# Patient Record
Sex: FEMALE
[De-identification: names, ages, dates, MRNs, and addresses within clinical notes are randomized per-mention and may not be internally consistent; named-entity substitution may affect disease eponyms.]

---

## 2019-11-20 ENCOUNTER — HOSPITAL ENCOUNTER (INPATIENT)
Dept: HOSPITAL 56 - MW.OBCHECK | Age: 44
LOS: 2 days | Discharge: HOME | DRG: 560 | End: 2019-11-22
Attending: OBSTETRICS & GYNECOLOGY | Admitting: OBSTETRICS & GYNECOLOGY
Payer: SELF-PAY

## 2019-11-20 DIAGNOSIS — Z3A.36: ICD-10-CM

## 2019-11-20 DIAGNOSIS — Z88.0: ICD-10-CM

## 2019-11-20 PROCEDURE — 0KQM0ZZ REPAIR PERINEUM MUSCLE, OPEN APPROACH: ICD-10-PCS | Performed by: OBSTETRICS & GYNECOLOGY

## 2019-11-20 NOTE — PCM.PREANE
Preanesthetic Assessment





- Procedure


Proposed Procedure: 





Labor epidural








- Anesthesia/Transfusion/Family Hx


Anesthesia History: Prior Anesthesia Without Reaction


Family History of Anesthesia Reaction: No





- Review of Systems


General: No Symptoms


Pulmonary: No Symptoms


Cardiovascular: No Symptoms


Gastrointestinal: No Symptoms


Neurological: No Symptoms


Other: Reports: None





- Physical Assessment


NPO Status Date: 19


NPO Status Time: 03:24


Height: 5 ft 5 in


Weight: 77.111 kg


ASA Class: 2


Mental Status: Alert & Oriented x3


Airway Class: Mallampati = 2


Dentition: Reports: Normal Dentition


Thyro-Mental Finger Breadths: 3


ROM/Head Extension: Full


Lungs: Clear to Auscultation, Normal Respiratory Effort


Cardiovascular: Regular Rate, Regular Rhythm


Other:  - 7 cm dilated after SROM





- Lab


Values: 





 Laboratory Last Values











WBC  10.11 K/uL (4.0-11.0)   19  02:58    


 


RBC  4.49 M/uL (4.30-5.90)   19  02:58    


 


Hgb  14.5 g/dL (12.0-16.0)   19  02:58    


 


Hct  41.0 % (36.0-46.0)   19  02:58    


 


MCV  91.3 fL (80.0-98.0)   19  02:58    


 


MCH  32.3 pg (27.0-32.0)  H  19  02:58    


 


MCHC  35.4 g/dL (31.0-37.0)   19  02:58    


 


RDW Std Deviation  45.8 fl (28.0-62.0)   19  02:58    


 


RDW Coeff of Nael  14 % (11.0-15.0)   19  02:58    


 


Plt Count  179 K/uL (150-400)   19  02:58    


 


MPV  12.40 fL (7.40-12.00)  H  19  02:58    


 


Nucleated RBC %  0.0 /100WBC  19  02:58    


 


Nucleated RBCs #  0 K/uL  19  02:58    














- Allergies


Allergies/Adverse Reactions: 


 Allergies











Allergy/AdvReac Type Severity Reaction Status Date / Time


 


Penicillins Allergy  unknown Verified 10/08/15 04:52














- Acknowledgements


Anesthesia Type Planned: Epidural


Pt an Appropriate Candidate for the Planned Anesthesia: Yes


Alternatives and Risks of Anesthesia Discussed w Pt/Guardian: Yes


Pt/Guardian Understands and Agrees with Anesthesia Plan: Yes





PreAnesthesia Questionnaire





- SUBSTANCE USE


Smoking Status *Q: Never Smoker


Second Hand Smoke Exposure: No


Recreational Drug Use History: No





- HOME MEDS


Home Medications: 


 Home Meds





Acetaminophen/HYDROcodone [Norco 325-5 MG] 1 tab PO Q4H PRN #40 tab 10/09/15 [Rx

]


Docusate Sodium [Colace] 100 mg PO BID #30 cap 10/09/15 [Rx]











- CURRENT (IN HOUSE) MEDS


Current Meds: 





 Current Medications





Butorphanol Tartrate (Stadol)  1 mg IVPUSH Q1H PRN


   PRN Reason: Pain


Carboprost Tromethamine (Hemabate Ds)  250 mcg IM ASDIRECTED PRN


   PRN Reason: Post Partum Hemorrhage


Clindamycin Phosphate 900 mg/ (Premix)  50 mls @ 100 mls/hr IV Q8H [facility]


   Last Admin: 19 03:06 Dose:  100 mls/hr


Lactated Ringer's (Ringers, Lactated)  1,000 mls @ 150 mls/hr IV ASDIRECTED [facility]


   Last Admin: 19 03:06 Dose:  150 mls/hr


Oxytocin/Sodium Chloride (Oxytocin 30 Unit/500 Ml-Ns)  30 unit in 500 mls @ 500 

mls/hr IV TITRATE JW


Tranexamic Acid 1,000 mg/ (Sodium Chloride)  110 mls @ 660 mls/hr IV ONETIME PRN


   PRN Reason: Bleeding


Lidocaine HCl (Xylocaine 1%)  50 ml INJECT ONETIME PRN


   PRN Reason: Laceration repair


Methylergonovine Maleate (Methergine)  0.2 mg IM ASDIRECTED PRN


   PRN Reason: Post Partum Hemorrhage


Misoprostol (Cytotec)  200 mcg PO ONETIME PRN


   PRN Reason: Post Partum Hemorrhage


Nalbuphine HCl (Nubain)  10 mg IVPUSH Q1H PRN


   PRN Reason: Pain (severe 7-10)


Ondansetron HCl (Zofran)  4 mg IVPUSH Q4H PRN


   PRN Reason: Nausea/Vomiting


Sodium Chloride (Saline Flush)  10 ml FLUSH ASDIRECTED PRN


   PRN Reason: Keep Vein Open


Sodium Chloride (Saline Flush)  2.5 ml FLUSH ASDIRECTED PRN


   PRN Reason: Keep Vein Open


Sodium Chloride (Normal Saline)  10 ml IV ASDIRECTED PRN


   PRN Reason: IV Use


Sterile Water (Sterile Water For Irrigation)  1,000 ml IRR ASDIRECTED PRN


   PRN Reason: delivery





Discontinued Medications





Fentanyl/Bupivacaine HCl (Fentanyl-Bupiv-Ns 2 Mcg/Ml-0.125%) Confirm 

Administered Dose 100 mls @ as directed .ROUTE .[facility] ONE


   Stop: 19 03:19

## 2019-11-20 NOTE — [id number]
Post Anesthesia Note





- EVALUATION WITHIN 48HRS OF ANESTHETIC


Vital Signs in Normal Range: Yes


Patient Participated in Evaluation: Yes


Respiratory Function Stable: Yes


Airway Patent: Yes


Cardiovascular Function Stable: Yes


Hydration Status Stable: Yes


Pain Control Satisfactory: Yes


Nausea and Vomiting Control Satisfactory: Yes


Mental Status Recovered: Yes





- COMMENTS/OBSERVATIONS


Free Text/Narrative:: 


Function fully returned. Has been up to the bathroom. No concerns.

## 2019-11-20 NOTE — PCM.DEL
L & D Note





- General Info


Date of Service: 19


Mother's Due Date: 19





- Delivery Note


Labor: Spontaneous


Delivery Outcome: Livebirth


Infant Delivery Mode: Spontaneous


Birth Presentation: Left Occiput Anterior (ARACELI)


Nuchal Cord: None


Prep: Other


Anesthesia Type: None


Amniotic Fluid Description: Clear


Episiotomy Type: None


Laceration: None


Placenta: Intact, Spontaneous


Cord: 3 Vessels


Estimated Blood Loss: 200


Resuscitation Needed: No


[place]: Suctioned


APGAR Score 1 min: 8


APGAR Score 5 min: 9





- General Info


Date of Service: 19





- Patient Data


Weight - Most Recent: 77.111 kg


Lab Results Last 24 Hours: 


 Laboratory Results - last 24 hr











  19 Range/Units





  02:58 02:58 


 


WBC   10.11  (4.0-11.0)  K/uL


 


RBC   4.49  (4.30-5.90)  M/uL


 


Hgb   14.5  (12.0-16.0)  g/dL


 


Hct   41.0  (36.0-46.0)  %


 


MCV   91.3  (80.0-98.0)  fL


 


MCH   32.3 H  (27.0-32.0)  pg


 


MCHC   35.4  (31.0-37.0)  g/dL


 


RDW Std Deviation   45.8  (28.0-62.0)  fl


 


RDW Coeff of Nael   14  (11.0-15.0)  %


 


Plt Count   179  (150-400)  K/uL


 


MPV   12.40 H  (7.40-12.00)  fL


 


Nucleated RBC %   0.0  /100WBC


 


Nucleated RBCs #   0  K/uL


 


Blood Type  B POSITIVE   


 


Antibody Screen  NEGATIVE   











Med Orders - Current: 


 Current Medications





Butorphanol Tartrate (Stadol)  1 mg IVPUSH Q1H PRN


   PRN Reason: Pain


Carboprost Tromethamine (Hemabate Ds)  250 mcg IM ASDIRECTED PRN


   PRN Reason: Post Partum Hemorrhage


Clindamycin Phosphate 900 mg/ (Premix)  50 mls @ 100 mls/hr IV Q8H [facility]


   Last Admin: 19 03:06 Dose:  100 mls/hr


Lactated Ringer's (Ringers, Lactated)  1,000 mls @ 150 mls/hr IV ASDIRECTED [facility]


   Last Admin: 19 03:52 Dose:  150 mls/hr


Oxytocin/Sodium Chloride (Oxytocin 30 Unit/500 Ml-Ns)  30 unit in 500 mls @ 500 

mls/hr IV TITRATE JW


Tranexamic Acid 1,000 mg/ (Sodium Chloride)  110 mls @ 660 mls/hr IV ONETIME PRN


   PRN Reason: Bleeding


Lidocaine HCl (Xylocaine 1%)  50 ml INJECT ONETIME PRN


   PRN Reason: Laceration repair


Methylergonovine Maleate (Methergine)  0.2 mg IM ASDIRECTED PRN


   PRN Reason: Post Partum Hemorrhage


Misoprostol (Cytotec)  200 mcg PO ONETIME PRN


   PRN Reason: Post Partum Hemorrhage


Nalbuphine HCl (Nubain)  10 mg IVPUSH Q1H PRN


   PRN Reason: Pain (severe 7-10)


Ondansetron HCl (Zofran)  4 mg IVPUSH Q4H PRN


   PRN Reason: Nausea/Vomiting


Sodium Chloride (Saline Flush)  10 ml FLUSH ASDIRECTED PRN


   PRN Reason: Keep Vein Open


Sodium Chloride (Saline Flush)  2.5 ml FLUSH ASDIRECTED PRN


   PRN Reason: Keep Vein Open


Sodium Chloride (Normal Saline)  10 ml IV ASDIRECTED PRN


   PRN Reason: IV Use


Sterile Water (Sterile Water For Irrigation)  1,000 ml IRR ASDIRECTED PRN


   PRN Reason: delivery





Discontinued Medications





Fentanyl/Bupivacaine HCl (Fentanyl-Bupiv-Ns 2 Mcg/Ml-0.125%) Confirm 

Administered Dose 100 mls @ as directed .ROUTE .[facility]


   Stop: 19 03:19











- Problem List & Annotations


(1)  premature rupture of membranes (PPROM) delivered, current 

hospitalization


SNOMED Code(s): 615964010, 064296305


   Code(s): O42.919 - PRETRM SAUNDRA ROM, UNSP TIME BETW RUPT AND ONST LABR, UNSP 

TRI   Status: Acute   Current Visit: Yes   





- Problem List Review


Problem List Initiated/Reviewed/Updated: Yes





- My Orders


Last 24 Hours: 


My Active Orders





19 02:43


Patient Status [ADT] Routine 


Fetal Heart Tones [RC] CONTINUOUS 


Fetal Non Stress Test [RC] PER UNIT ROUTINE 


May Shower [RC] ASDIRECTED 


Notify Provider [RC] PRN 


Up ad Alicia [RC] ASDIRECTED 


Vaginal Exam [RC] PRN 


Vital Signs [RC] PER UNIT ROUTINE 


Butorphanol [Stadol]   1 mg IVPUSH Q1H PRN 


Carboprost Tromethamine [Hemabate DS]   250 mcg IM ASDIRECTED PRN 


Lidocaine 1% [Xylocaine 1%]   50 ml INJECT ONETIME PRN 


Methylergonovine [Methergine]   0.2 mg IM ASDIRECTED PRN 


Nalbuphine [Nubain]   10 mg IVPUSH Q1H PRN 


Ondansetron [Zofran]   4 mg IVPUSH Q4H PRN 


Sodium Chloride 0.9% [Normal Saline]   10 ml IV ASDIRECTED PRN 


Sodium Chloride 0.9% [Saline Flush]   10 ml FLUSH ASDIRECTED PRN 


Sodium Chloride 0.9% [Saline Flush]   2.5 ml FLUSH ASDIRECTED PRN 


Tranexamic Acid [Cyklokapron] 1,000 mg   Sodium Chloride 0.9% [Normal Saline] 

100 ml IV ONETIME 


Water For Irrigation,Sterile [Sterile Water for Irrigation]   1,000 ml IRR 

ASDIRECTED PRN 


miSOPROStoL [Cytotec]   200 mcg PO ONETIME PRN 


Fetal Scalp Electrode [WOMSER] Per Unit Routine 


Peripheral IV Insertion Adult [OM.PC] Routine 


Resuscitation Status Routine 





19 02:45


Clindamycin Phosphate in D5W [Cleocin in D5W] 900 mg   Premix Bag 1 bag IV Q8H 


Lactated Ringers [Ringers, Lactated] 1,000 ml IV ASDIRECTED 


Oxytocin/0.9 % Sodium Chloride [Oxytocin 30 Unit/500 ML-NS] 30 unit in 500 ml 

IV TITRATE 





19 02:58


RAPID PLASMA REAGIN, QUANT [REF] Stat 





19 Breakfast


Clear Liquid Diet [DIET]

## 2019-11-20 NOTE — OR
SURGEON:

Genna Ocasio M.D.

 

DATE OF PROCEDURE:  2019

 

PREOPERATIVE DIAGNOSIS:

A 36-6/7-week intrauterine pregnancy,  premature rupture of membranes

with labor.

 

POSTOPERATIVE DIAGNOSIS:

A 36-6/7-week intrauterine pregnancy,  premature rupture of membranes

with labor.

 

PROCEDURES:

Term spontaneous vaginal delivery, repair of second-degree laceration.

 

PRIMARY SURGEON:

Genna Ocasio M.D.

 

ANESTHESIA:

Epidural.

 

ESTIMATED BLOOD LOSS:

Less than 200 mL.

 

FINDINGS:

Liveborn female, Apgar score of 8 and 9, weighing 3120 g.  Placenta spontaneous,

Schultze intact, with 3-vessel small second-degree perineal laceration repaired.

 

COMPLICATIONS:

None known.

 

DISPOSITION:

Mother and baby are in LDR in good condition.

 

BRIEF HISTORY:

This is a 44-year-old female, G8, P7.  Prenatal care has been 3 visits in

[place], [place].  She requested minimal intervention.  She did not receive her

28-week labs for Glucola.  She did not receive any immunizations during

pregnancy, and she did not have any antepartum chromosomal screening for

advanced maternal age, and she has not yet had a group B Strep.  She presented

with spontaneous rupture of membranes, 5-6 cm dilated, category 1 fetal heart

tones.  She requested epidural.  She was admitted.  IV fluids were initiated.

She received an epidural for pain control.  Within 20 minutes after the

epidural, she was complete.

 

DESCRIPTION OF PROCEDURE:

With the patient in dorsal lithotomy position, the patient pushed over 3

contractions to a 5+ station, at which time, the head was delivered

spontaneously and atraumatically over the perineum with support with subsequent

delivery of the infant's shoulders and body without any difficulty.  The infant

was bulb suctioned by nose and mouth, and the cord was clamped x2 and cut after

it had ceased to pulsate.  The infant was a liveborn female, Apgar score of 8

and 9, weighing 3120 g.  Pitocin was initiated after delivery of the infant to

assist with the placenta, which was delivered spontaneously, Schultze intact

with 3 vessels.  Upon inspection of pelvis and perineum, there were no

periurethral, vaginal sidewall, cervical, or rectal lacerations.  At the site of

a prior repair, there was a separation of a small second-degree perineal

laceration that was repaired using a running lock suture of 3-0 Polysorb for the

vaginal mucosa with a deep running suture of the same for the perineum and

subcuticular suture of the same for the skin.  Final sponge, needle, and

instrument counts were correct.  The patient had received clindamycin 900 mg as

well as obtaining a group B Strep culture prior to delivery.  There were no

known complications.  Mother and baby remained in LDR in good condition.

 

 

MIGUELITO JANSEN

DD:  2019 04:41:47

DT:  2019 10:29:18

Job #:  [id number]

## 2019-11-20 NOTE — HP
YOB: 1975

 

PRIMARY CARE PHYSICIAN:

None PCP

 

CHIEF COMPLAINT:

Rupture of membranes.

 

HISTORY OF PRESENT ILLNESS:

This is a 44-year-old female.  She is G8, P7.  She presents at 36 and 6/7 weeks'

gestation, spontaneous rupture of membranes, copious clear fluid at home.  Good

fetal movement, irregular contractions.  Initially 6 cm, 80%, posterior.  She

has had her prenatal care of a total of 3 visits in [place], [place].  As she

desired to have minimal prenatal care, she declined genetic screening, declined

all immunizations, declined glucose and CBC.  She has not had a group B strep

obtained yet.  Records from [facility] have been obtained and are

reviewed.  She has had no ultrasounds since 20 weeks of gestation.  Blood

pressures during her visits have been normal, most recently 120/66 in September.

 

PAST MEDICAL HISTORY:

Negative for chronic illness.

 

PAST SURGICAL HISTORY:

Appendectomy in .

 

ALLERGIES:

To penicillin, which she states causes a rash, records from [facility]

anaphylaxis.

 

CURRENT MEDICATIONS:

Prenatal vitamins.

 

PAST OBSTETRIC HISTORY:

She has had 7 vaginal deliveries, most recently in , with the largest of 7

pounds 13 ounces.

 

SOCIAL HISTORY:

She is .  She denies use of tobacco, alcohol, or street drugs.

 

FAMILY HISTORY:

Significant for mother with hypertension and hyperlipidemia.  Father with lung

cancer, hypertension, hyperlipidemia, and cerebrovascular accident.

 

PHYSICAL EXAMINATION:

VITAL SIGNS:  Blood pressure 140/76, pulse of 87.  Fetal heart tones are 130,

moderate variability, accelerations present, no decelerations.  Contractions are

every 4 to 7 minutes.

GENERAL:  She is alert and oriented, in no acute distress.

NECK:  Supple without lymphadenopathy or thyromegaly.

LUNGS:  Clear bilaterally.

CARDIOVASCULAR:  Regular rate without murmur.

ABDOMEN:  Soft, gravid, nontender.  Estimated fetal weight of 3000 g.

EXTREMITIES:  Show trace edema.

VAGINAL EXAM:  7, 80, -1 station.

 

ASSESSMENT AND PLAN:

36 and 6/7 weeks' gestation,  premature rupture of membranes with onset

of labor.  Unknown group B strep status.  Unknown diabetes status.  No

immunizations during pregnancy.  We will admit for labor management.  She

requests epidural, and Anesthesia has been notified and is present.  Clindamycin

has been started for group B strep prophylaxis in the absence of culture

results.  She has category 1 fetal heart tones at this time.

 

 

MIGUELITO / INDERJIT

DD:  2019 03:31:13

DT:  2019 09:08:02

Job #:  [id number]

## 2019-11-21 NOTE — PCM.PNPP
- General Info


Date of Service: 19


Subjective Update: 





Patient without complaints this morning.


Functional Status: Reports: Pain Controlled, Tolerating Diet, Ambulating, 

Urinating





- Review of Systems


General: Reports: No Symptoms


HEENT: Reports: No Symptoms


Pulmonary: Reports: No Symptoms


Cardiovascular: Reports: No Symptoms


Gastrointestinal: Reports: No Symptoms


Genitourinary: Reports: No Symptoms


Musculoskeletal: Reports: No Symptoms


Skin: Reports: No Symptoms


Neurological: Reports: No Symptoms


Psychiatric: Reports: No Symptoms





- Patient Data


Vital Signs - Most Recent: 


 Last Vital Signs











Temp  36.1 C   19 20:30


 


Pulse  75   19 20:30


 


Resp  15   19 20:30


 


BP  134/83   19 20:30


 


Pulse Ox  98   19 20:30











Weight - Most Recent: 77.111 kg


Lab Results - Last 24 Hours: 


 Laboratory Results - last 24 hr











  19 Range/Units





  05:36 


 


Hgb  12.7  (12.0-16.0)  g/dL


 


Hct  37.9  (36.0-46.0)  %











Med Orders - Current: 


 Current Medications





Acetaminophen (Tylenol Extra Strength)  1,000 mg PO Q6H PRN


   PRN Reason: Abdominal Pain


Bisacodyl (Dulcolax)  10 mg RECTAL ONETIME PRN


   PRN Reason: Constipation


Diphenhydramine HCl (Benadryl)  25 mg IVPUSH Q6H PRN


   PRN Reason: Itching or Nausea


Docusate Sodium (Colace)  100 mg PO BID JW


   Last Admin: 19 21:00 Dose:  Not Given


Emollient Ointment (Lansinoh Hpa)  0 gm TOP ASDIRECTED PRN


   PRN Reason: Sore Nipples


Lactated Ringer's (Ringers, Lactated)  1,000 mls @ 125 mls/hr IV ASDIRECTED [facility]


Ibuprofen (Motrin)  800 mg PO Q8H PRN


   PRN Reason: mild pain or fever


Ibuprofen (Motrin)  800 mg PO Q8H PRN


   PRN Reason: Abdominal Pain


   Last Admin: 19 23:35 Dose:  800 mg


Methylergonovine Maleate (Methergine)  0.2 mg IM ONETIME PRN


   PRN Reason: Excessive Vaginal Bleeding


Ondansetron HCl (Zofran)  4 mg IVPUSH Q4H PRN


   PRN Reason: Nausea/Vomiting





Discontinued Medications





Butorphanol Tartrate (Stadol)  1 mg IVPUSH Q1H PRN


   PRN Reason: Pain


Carboprost Tromethamine (Hemabate Ds)  250 mcg IM ASDIRECTED PRN


   PRN Reason: Post Partum Hemorrhage


Clindamycin Phosphate 900 mg/ (Premix)  50 mls @ 100 mls/hr IV Q8H [facility]


   Last Admin: 19 03:06 Dose:  100 mls/hr


Lactated Ringer's (Ringers, Lactated)  1,000 mls @ 150 mls/hr IV ASDIRECTED [facility]


   Last Admin: 19 03:52 Dose:  150 mls/hr


Oxytocin/Sodium Chloride (Oxytocin 30 Unit/500 Ml-Ns)  30 unit in 500 mls @ 500 

mls/hr IV TITRATE [facility]


   Last Admin: 19 04:38 Dose:  500 mls/hr


Tranexamic Acid 1,000 mg/ (Sodium Chloride)  110 mls @ 660 mls/hr IV ONETIME PRN


   PRN Reason: Bleeding


Fentanyl/Bupivacaine HCl (Fentanyl-Bupiv-Ns 2 Mcg/Ml-0.125%) Confirm 

Administered Dose 100 mls @ as directed .ROUTE .[facility]


   Stop: 19 03:19


Lidocaine HCl (Xylocaine 1%)  50 ml INJECT ONETIME PRN


   PRN Reason: Laceration repair


Methylergonovine Maleate (Methergine)  0.2 mg IM ASDIRECTED PRN


   PRN Reason: Post Partum Hemorrhage


Misoprostol (Cytotec)  200 mcg PO ONETIME PRN


   PRN Reason: Post Partum Hemorrhage


Nalbuphine HCl (Nubain)  10 mg IVPUSH Q1H PRN


   PRN Reason: Pain (severe 7-10)


Ondansetron HCl (Zofran)  4 mg IVPUSH Q4H PRN


   PRN Reason: Nausea/Vomiting


Sodium Chloride (Saline Flush)  10 ml FLUSH ASDIRECTED PRN


   PRN Reason: Keep Vein Open


Sodium Chloride (Saline Flush)  2.5 ml FLUSH ASDIRECTED PRN


   PRN Reason: Keep Vein Open


Sodium Chloride (Normal Saline)  10 ml IV ASDIRECTED PRN


   PRN Reason: IV Use


Sterile Water (Sterile Water For Irrigation)  1,000 ml IRR ASDIRECTED PRN


   PRN Reason: delivery


   Last Admin: 19 04:39 Dose:  1,000 ml











- Infant Interaction


Infant Disposition, Postpartum: Interior to Nursery


Infant Interaction: Not Interacting


Infant Feeding: Attempted Breastfeeding; Nursed Fair/Poor, Bottle Fed Infant





- Postpartum Recovery Exam


Fundal Tone: Firm


Fundal Level: 1 Fingerbreadths Below Umbilicus


Fundal Placement: Midline


Lochia Amount: Scant


Lochia Color: Rubra/Red


Episiotomy/Laceration: None


Bladder Status: Voiding


Urinary Elimination: Voided





- Exam


General: Alert, Oriented


Neck: Supple


Lungs: Clear to Auscultation, Normal Respiratory Effort


Cardiovascular: Regular Rate, Regular Rhythm


GI/Abdominal Exam: Soft, Non-Tender


Extremities: Non-Tender, No Pedal Edema


Skin: Warm, Dry, Intact


Neurological: No New Focal Deficit


Psy/Mental Status: Alert, Normal Affect, Normal Mood





- Problem List & Annotations


(1)  premature rupture of membranes (PPROM) delivered, current 

hospitalization


SNOMED Code(s): 932602354, [id number]


   Code(s): O42.919 - PRETRM SAUNDRA ROM, UNSP TIME BETW RUPT AND ONST LABR, UNSP 

TRI   Status: Acute   Current Visit: Yes   





- Problem List Review


Problem List Initiated/Reviewed/Updated: Yes





- My Orders


Last 24 Hours: 


My Active Orders





19 23:24


Acetaminophen [Tylenol Extra Strength]   1,000 mg PO Q6H PRN 


Ibuprofen [Motrin]   800 mg PO Q8H PRN 














- Assessment


Assessment:: 





45yo P8 s/p  at 36w6d, PPD#1





- Plan


Plan:: 





Patient undecided if she would like discharge home today or tomorrow. Reviewed 

discharge instructions, will place order if baby able to be discharged and 

patient desires to go home.

## 2019-11-22 VITALS — HEART RATE: 66 BPM | SYSTOLIC BLOOD PRESSURE: 121 MMHG | DIASTOLIC BLOOD PRESSURE: 86 MMHG

## 2019-11-22 NOTE — PCM.PNPP
- General Info


Date of Service: 19


Subjective Update: 





Patient reports hemorrhoid swelling down today. No other concerns.


Functional Status: Reports: Pain Controlled, Tolerating Diet, Ambulating, 

Urinating





- Review of Systems


General: Reports: No Symptoms


HEENT: Reports: No Symptoms


Pulmonary: Reports: No Symptoms


Cardiovascular: Reports: No Symptoms


Gastrointestinal: Reports: No Symptoms


Genitourinary: Reports: No Symptoms


Musculoskeletal: Reports: No Symptoms


Skin: Reports: No Symptoms


Neurological: Reports: No Symptoms


Psychiatric: Reports: No Symptoms





- Patient Data


Vital Signs - Most Recent: 


 Last Vital Signs











Temp  36.4 C   19 08:12


 


Pulse  66   19 08:12


 


Resp  18   19 08:12


 


BP  121/86   19 08:12


 


Pulse Ox  97   19 08:12











Weight - Most Recent: 77.111 kg


Med Orders - Current: 


 Current Medications





Acetaminophen (Tylenol Extra Strength)  1,000 mg PO Q6H PRN


   PRN Reason: Abdominal Pain


Bisacodyl (Dulcolax)  10 mg RECTAL ONETIME PRN


   PRN Reason: Constipation


Diphenhydramine HCl (Benadryl)  25 mg IVPUSH Q6H PRN


   PRN Reason: Itching or Nausea


Docusate Sodium (Colace)  100 mg PO BID JW


   Last Admin: 19 08:15 Dose:  Not Given


Emollient Ointment (Lansinoh Hpa)  0 gm TOP ASDIRECTED PRN


   PRN Reason: Sore Nipples


Hydrocortisone (Proctozone-Hc 2.5% Crm)  0 gm TOP 6XDAY PRN


   PRN Reason: Itching


Lactated Ringer's (Ringers, Lactated)  1,000 mls @ 125 mls/hr IV ASDIRECTED [facility]


Ibuprofen (Motrin)  800 mg PO Q8H PRN


   PRN Reason: mild pain or fever


Ibuprofen (Motrin)  800 mg PO Q8H PRN


   PRN Reason: Abdominal Pain


   Last Admin: 19 08:15 Dose:  800 mg


Methylergonovine Maleate (Methergine)  0.2 mg IM ONETIME PRN


   PRN Reason: Excessive Vaginal Bleeding


Ondansetron HCl (Zofran)  4 mg IVPUSH Q4H PRN


   PRN Reason: Nausea/Vomiting


Witch Hazel (Tucks)  1 pad TOP ASDIRECTED PRN


   PRN Reason: comfort care


   Last Admin: 19 16:53 Dose:  1 tub





Discontinued Medications





Butorphanol Tartrate (Stadol)  1 mg IVPUSH Q1H PRN


   PRN Reason: Pain


Carboprost Tromethamine (Hemabate Ds)  250 mcg IM ASDIRECTED PRN


   PRN Reason: Post Partum Hemorrhage


Clindamycin Phosphate 900 mg/ (Premix)  50 mls @ 100 mls/hr IV Q8H [facility]


   Last Admin: 19 03:06 Dose:  100 mls/hr


Lactated Ringer's (Ringers, Lactated)  1,000 mls @ 150 mls/hr IV ASDIRECTED [facility]


   Last Admin: 19 03:52 Dose:  150 mls/hr


Oxytocin/Sodium Chloride (Oxytocin 30 Unit/500 Ml-Ns)  30 unit in 500 mls @ 500 

mls/hr IV TITRATE [facility]


   Last Admin: 19 04:38 Dose:  500 mls/hr


Tranexamic Acid 1,000 mg/ (Sodium Chloride)  110 mls @ 660 mls/hr IV ONETIME PRN


   PRN Reason: Bleeding


Fentanyl/Bupivacaine HCl (Fentanyl-Bupiv-Ns 2 Mcg/Ml-0.125%) Confirm 

Administered Dose 100 mls @ as directed .ROUTE .STK-MED ONE


   Stop: 19 03:19


   Last Admin: 19 08:32 Dose:  Not Given


Lidocaine HCl (Xylocaine 1%)  50 ml INJECT ONETIME PRN


   PRN Reason: Laceration repair


Methylergonovine Maleate (Methergine)  0.2 mg IM ASDIRECTED PRN


   PRN Reason: Post Partum Hemorrhage


Misoprostol (Cytotec)  200 mcg PO ONETIME PRN


   PRN Reason: Post Partum Hemorrhage


Nalbuphine HCl (Nubain)  10 mg IVPUSH Q1H PRN


   PRN Reason: Pain (severe 7-10)


Ondansetron HCl (Zofran)  4 mg IVPUSH Q4H PRN


   PRN Reason: Nausea/Vomiting


Sodium Chloride (Saline Flush)  10 ml FLUSH ASDIRECTED PRN


   PRN Reason: Keep Vein Open


Sodium Chloride (Saline Flush)  2.5 ml FLUSH ASDIRECTED PRN


   PRN Reason: Keep Vein Open


Sodium Chloride (Normal Saline)  10 ml IV ASDIRECTED PRN


   PRN Reason: IV Use


Sterile Water (Sterile Water For Irrigation)  1,000 ml IRR ASDIRECTED PRN


   PRN Reason: delivery


   Last Admin: 19 04:39 Dose:  1,000 ml











- Infant Interaction


Infant Disposition, Postpartum:  at Bedside


Infant Interaction: Not Interacting


Infant Feeding: Attempted Breastfeeding; Nursed Fair/Poor, Bottle Fed Infant





- Postpartum Recovery Exam


Fundal Tone: Firm


Fundal Level: 3 Fingerbreadths Below Umbilicus


Fundal Placement: Midline


Lochia Amount: Scant


Lochia Color: Rubra/Red


Episiotomy/Laceration: None


Bladder Status: Voiding


Urinary Elimination: Voided





- Exam


General: Alert, Oriented


HEENT: Pupils Equal


Neck: Supple


Lungs: Clear to Auscultation, Normal Respiratory Effort


Cardiovascular: Regular Rate, Regular Rhythm


GI/Abdominal Exam: Normal Bowel Sounds, Soft, Non-Tender


Extremities: Normal Inspection, Non-Tender, No Pedal Edema


Skin: Warm, Dry, Intact


Neurological: No New Focal Deficit


Psy/Mental Status: Alert, Normal Affect, Normal Mood





- Problem List & Annotations


(1)  premature rupture of membranes (PPROM) delivered, current 

hospitalization


SNOMED Code(s): 879804152, 133170283


   Code(s): O42.919 - PRETRM SAUNDRA ROM, UNSP TIME BETW RUPT AND ONST LABR, UNSP 

TRI   Status: Acute   Current Visit: Yes   





- Problem List Review


Problem List Initiated/Reviewed/Updated: Yes





- My Orders


Last 24 Hours: 


My Active Orders





19 07:57


Ready for Discharge [RC] PER UNIT ROUTINE 


Hydrocortisone [Proctozone-HC 2.5% Crm]   See Dose Instructions  TOP 6XDAY PRN 


Witch Hazel [Tucks]   1 pad TOP ASDIRECTED PRN 





19 07:58


Sitz Bath [OM.PC] Per Unit Routine 





19 08:32


Ready for Discharge [RC] PER UNIT ROUTINE 














- Assessment


Assessment:: 





45yo P8 s/p  at 36w6d, PPD#2





- Plan


Plan:: 





Discharge home today. Reviewed discharge instructions.